# Patient Record
Sex: FEMALE | NOT HISPANIC OR LATINO | Employment: FULL TIME | URBAN - METROPOLITAN AREA
[De-identification: names, ages, dates, MRNs, and addresses within clinical notes are randomized per-mention and may not be internally consistent; named-entity substitution may affect disease eponyms.]

---

## 2023-09-19 ENCOUNTER — DOCUMENTATION ONLY (OUTPATIENT)
Dept: TRANSPLANT | Facility: CLINIC | Age: 23
End: 2023-09-19

## 2023-09-19 ENCOUNTER — TELEPHONE (OUTPATIENT)
Dept: TRANSPLANT | Facility: CLINIC | Age: 23
End: 2023-09-19

## 2023-09-19 NOTE — TELEPHONE ENCOUNTER
"Voucher: Opted-In Registered As: Family Voucher Donor  Donor Intake Start: 23 Donor Intake Complete: 23  Gender: Female Preferred Language: English  Full Name: Kirk Bruce Is  Needed: [not answered]  E-mail: [email or web address] Phone Number: [phone]  Secondary Phone:  Contact Preference: [not answered] Best Contact Time: 9am - 3pm  Emergency Contact: Kalie Bruce Emergency Contact #: [phone]  Relationship to Contact: Contact is my parent  : [birth year] Age: 23  Address: [address]  City: [place]  State: [place] Postal Code: [id number]  Height: 5'6\" Weight: 120lbs  BMI: 19.4  Employment Status: Employed Has PTO for donation? Yes, employer reimbursed  (addt'l to vacation)  Occupation:  Officer Requires Heavy Lifting? No  Education Level: Four Year Degree Marital Status: Single  Exercise Routine: Occasional Health Insurance: Yes  Blood Type: Unknown Ethnicity/Race: Multi-Racial  Donor Type: Family Voucher Donor  Prefer Remote Donation: [not answered]  Physician: Raina Tse<br/>Pepe RUGGIERO [facility]  Motivation to donate: Dyana Hernández, a popular podcaster and Instagram influencer, shared the story of her mother  and  participating in the family match program.  Living Donor Pre-Screening  Is In [place]  Will accept blood transfusions? Yes  Has been diagnosed with kidney disease? No  Has had a heart attack? No  Has Diabetes (High BGs)? No  Has had cancer? No  Has had kidney stones? No  Has ever been pregnant? No  Is Planning on Pregnancy? No  Is Taking Birth Control? No  Has Used Tobacco? No  Has HIV? No  Is Currently Incarcerated? No  Is Currently Residing in [place] Yes  History Misc  Has Allergies? No  Has had Surgeries? Yes  Surgery When  Twisp teeth removal 2019  Takes Medication? Yes  Medication Dose Frequency  Zoloft/sertraline 25mg daily  Medical History  History of High BP? Never  History of CABG (bypass surgery)? No  History of blood clots? Never  History of " coronary disease? Never  History of high cholesterol or triglycerides? Never  Has stents implanted? No  History of chest pain during exercise? Unknown  History of chest pain at other times? No  Results of climbing 2 flights of stairs? Shortness Of Breath  Had stress test in last year? No  Has had stroke? No  Has had leg bypass? No  History of lung disease? Never  History of COPD? Never  History of TB? Never  History of Pneumonia? Never  Has respiratory issues? No  Has HIV? No  Has Gastro Issues? No  History of Gallstones? Never  History of Pancreatitis? Never  History of Liver Disease? Never  History of Hepatitis B? Never  History of Hepatitis C? Never  History of bleeding problems? Never  History of UTIs? Yes   - UTI episodes: 2   - years since last UTI: 3 years  History of kidney damage? Never  History of Proteinuria? Never  History of Hematuria? Never  History of neuro disease? Never  History of seizure? Never  History of lupus? Never  History of paralysis? Never  History of arthritis? Never  History of neuropathy? Never  History of depression? Unknown  History of anxiety? Still being treated  History of documented psychiatric illness? Never  History of Fibroid Uterus? Never  History of Endometriosis? Never  History of Polycystic Ovaries? Never  Has had Miscarriages? No  Has had abortions? No  Has had transfusions? No  History of Obesity? No  History of Fabry's Disease? No  History of Sickle Cell Disease? No  History of Sickle Cell Trait? No  History of Sarcoidosis? No  Has auto-immune disease? No  Has had Physical Exam? Yes   - how many years ago: 1  Has had Mammogram? Unknown  Has had Pap Smear? Yes   - how many years ago: 2  Colonoscopy? Unknown  Medical history comments? [no comments]  Living Donor Family Medical History  Anyone with kidney disease? No  Anyone with liver disease? No  Anyone with heart disease? Yes   - which family members: Maternal grandmother  Anyone with coronary artery disease?  Unknown  Anyone with high blood pressure? Yes   - which family members: Mother  Anyone with blood disorder? Unknown  Anyone with cancer? No  Anyone with kidney cancer? No  Anyone with diabetes? No  Is mother alive? Yes  Mother's age? 58  Is father alive? Yes  Father's age? 58  How many siblings? 1  How many adult children? 0  How many children under 18? 0  Social History  Has Used Alcohol? Yes   - currently uses alcohol: Yes   - how much: 2/Weekly  Has Abused Alcohol? No  Has Used Drugs? No  Has had legal issues w/ law enforcement? No  Traveled over 100 miles from home in last year? Yes   - Traveled Where?  [place], [place], [place],  [place], [place], [place], [place],  [place]  Has had suicidal thoughts or attempts in the last five years? No

## 2023-09-25 ENCOUNTER — TELEPHONE (OUTPATIENT)
Dept: TRANSPLANT | Facility: CLINIC | Age: 23
End: 2023-09-25

## 2023-09-25 NOTE — TELEPHONE ENCOUNTER
Initial Independent Living Donor Advocate contact made with potential donor today.  I introduced myself and my role during the donation process, including:   LEW ROLE   The federal government requires that all licensed transplant centers provide the living donor with an Independent Living Donor Advocate (LEW).  I do not meet recipients or attend meetings that discuss their care or decision to transplant them. My role is separate to avoid any conflict of interest.  My role is to ensure:  1) your rights are protected;  2) you get all the information you need from the transplant team to make a fully informed decision whether to donate;   3) that living donation is in your best interest.   4) that you have the right to decide NOT to go forward with living donation at any time during this process.  I am available to you throughout the workup, during surgery phase and follow-up at home.     WORKUP & PRIVACY   Your identity and workup are not shared with the recipient at any time.   The recipient's insurance covers the medical expenses related to the donor evaluation and surgery.  However, it is important that you carry your own health insurance to address any medical issues that are found and are NOT related to living donation.  Additionally, age appropriate cancer screening (I.e. mammograms,  colonoscopies, etc) is required and would be through your insurance.  There is a psychosocial and medical donor workup that consists of testing to determine if you are healthy enough to donate. Workup tests include tissue typing/genetics, many blood draws, urine collection/ (kidney function testing), chest x-ray, EKG/other heart testing, CT scan. Age appropriate cancer screening is required and would be through your insurance. As you complete each step then you may move on to the next.  Workup can take as little or as long as you need and you can stop the process at any time. Transplant is a treatment option, not a cure. A kidney  from a living kidney donor can last 12-14 years.  Other treatment options are  donation and two types of dialysis.   This is major surgery and your estimated hospital stay is approximately 1-2 nights.  After surgery, there are driving and lifting restrictions - no driving for two weeks and no lifting over ten pounds for 8 weeks.  Donors are routinely off from work for 4 - 6 weeks after surgery, and potentially longer if they have a physical job.     If you anticipate lost wages due to donation, donor wage reimbursement options may be available to you and will be reviewed with you during the evaluation process. Donor Shield and NLDAC explained.  We reviewed the importance of completing follow-up labs and surveys at six months, 1 year and 2 years after donation to monitor kidney health and the impact donation has had on their life post donation.        QUESTIONS    Have you received the Welcome e-mail that includes copies of the informed consent, financial letter, information on donor shield and NLDAC from the transplant department? Yes.    Have you discussed with anyone your potential decision to donate?   Yes.    Is anyone pressuring or coercing you to donate? No.    Have you discussed any financial arrangements with recipient around donating a kidney? No.    Are you aware that you can confidentially opt out at any time, up to and including day of donation? Yes.    At this time, would you like to proceed with the medical evaluation to see if you can be a kidney donor?  No.    If yes, I will make an appointment for your donor coordinator to reach out to you with next steps.     Contact information for LEW's was provided Yes.    Audra Aquino- [phone]  Ny Jha- [phone]    Confirmed that Kirk reviewed Informed consent document and all questions answered.  Reviewed that they will receive Docusign to obtain electronic signature for the following: Informed consent, SRTR data, PORTILLO for medical  information, Auth for Electronic communication, Kidney for Life and will need their signed consent back before proceeding with evaluation.     Time frame for donation: open  Paired exchange was introduced  Yes.      MyChart was initiated  No.  CareEverywhere was initiated No.    LEW NOTES: Kirk is in the  and is moving to [place] in 3 days.  I did educate her about kidney donation, but she is going to get moved and then reach out to the [facility] center in [place], [place] to do her evaluation.      Duration of call 30 minutes

## 2023-10-10 ENCOUNTER — TELEPHONE (OUTPATIENT)
Dept: TRANSPLANT | Facility: CLINIC | Age: 23
End: 2023-10-10

## 2023-10-10 NOTE — TELEPHONE ENCOUNTER
I LM with her with my contact phone number and telling her that I can assist her with transferring her care to the [facility] transplant program of her choice.  I asked her to let me know where she would like to go.  Shelly Otoole RN  Living Donor Coordinator  10/10/2023 1:36 PM

## 2023-10-11 NOTE — TELEPHONE ENCOUNTER
Kirk confirmed that she has moved to [place] and wants to tranfer care to [facility].  I answered her questions. I have alerted the [facility] Coordinator.  I thanked the patient for her interest in donation and wished her well.  Donor Episode closed.  Shelly Otoole RN  Living Donor Coordinator  10/11/2023 11:56 AM